# Patient Record
Sex: FEMALE | URBAN - METROPOLITAN AREA
[De-identification: names, ages, dates, MRNs, and addresses within clinical notes are randomized per-mention and may not be internally consistent; named-entity substitution may affect disease eponyms.]

---

## 2023-04-06 ENCOUNTER — ATHLETIC TRAINING (OUTPATIENT)
Dept: SPORTS MEDICINE | Facility: OTHER | Age: 23
End: 2023-04-06

## 2023-04-06 DIAGNOSIS — S09.93XA TOOTH INJURY, INITIAL ENCOUNTER: Primary | ICD-10-CM

## 2023-04-06 NOTE — PROGRESS NOTES
Subjective: Ingrid Flannery evaluated by  with complaints of tooth pain  Describes mechanism of injury as being tagged in the face with a softball while running to second base  Injury occurred during a home softball game on 3/30/23  She was alert and did not complain of any concussion S/S  She did appear to have a misplaced and chipped tooth as well as a laceration on her lip  Objective    Observation     Obvious tooth displacement and damage, no obvious deformities of the face or nose  No immediate bruising or swelling  Range of Motion: Range of motion was screened using visual estimates for purposes on field evaluation  If limitations were noted, formal goniometric measures were considered  Results denoted as being within normal limits (WNL), hypermobile (hyper), hypomobile (hypo), or not tested (NT)  Results for Ingrid Flannery are as followed:     Cervical Range of Motion:     AROM   Right  Left    Cervical Flexion WNL  WNL  Cervical Extension WNL  WNL  Cervical Rotation WNL  WNL  Cervical Sidebending WNL  WNL     Upper Extremity Range of Motion:     AROM   Right  Left    Shoulder Flexion WNL  WNL  Shoulder Abduction WNL  WNL  Shoulder ER  WNL  WNL  Shoulder IR  WNL  WNL  Elbow Flexion  WNL  WNL  Elbow Extension WNL  WNL  Wrist Flexion  WNL  WNL  Wrist Extension WNL  WNL     Strength Assessment    Manual Muscle Testing (MMT)    Upper Extremity MMT  Right Left  Shoulder Flexion  5/5 5/5  Shoulder Abduction  5/5 5/5  Shoulder External Rotation 5/5 5/5  Shoulder Internal Rotation  5/5 5/5  Shoulder Extension   5/5 5/5  Elbow Flexion    5/5 5/5  Elbow Extension  5/5 5/5     Palpation      No palpable tenderness around facial bones     Special Tests and Measures    Schaefer Sign: Schaefer sign is a contusion behind the ear that indicates a fracture at the bottom of the skull   Results for Ingrid Flannery are as followed: Negative    Racoon Eyes Sign: Racoon eyes sign is a contusion of the eyes that "indicates a basilar skull fracture  Results for Petros Moser are as followed: Negative    Cranial Nerve Testing           Cranial Nerve   Location     Type          Assessment      Results for Petros Moser   I - Olfactory  Cerebrum Sensory Patient is introduced to various smells while eyes are closed  Asked to identify smells to best of his/her ability   -   II - Optic  Cerebrum Sensory Assessed using peripheral vision screen  AT is behind patient and asks patient to state when finger first enters field of view  Compared bilaterally  -   III - Oculomotor Midbrain Motor Patient instructed to follow AT's finger traveling in \"H\" pattern  Also, observations of ptosis are reported  -   IV - Trochlear Midbrain Motor Patient instructed to follow AT's finger traveling in \"J\" pattern -   V - Trigeminal  Helen Mixed Light touch sensory assessment performed on face bilaterally as well as assessment of muscles of mastication performed  -   VI - Abducens  Helen Motor Saccadic eye movements assessed  -   VII - Facial Helen Mixed Assessment of ability to copy facial expressions performed  -    VIII - Vestibulocochlear  Helen Sensory AT rubs fingers together brining it closer to patient's ear  Patient reports when able to hear rubbing  Assessed bilaterally  -   IX - Glossopharyngeal Medulla  Mixed Tested with CN X   -   X - Vagus  Medulla  Mixed Assessed with observations of palate elevation, presence of dry mouth and/or hoarse voice  -   XI - Accessory  Medulla  Motor Trapezius and SCM MMT -   XIII - Hypoglossal  Medulla  Motor AT instructs patient for stick out tongue and say \"[place]\"  -      Sport Concussion Assessment Tool - 5th Edition (SCAT5): The SCAT5 is a standardized tool for evaluating concussions  Any athlete suspected of having a concussion will be removed from play, medically assessed and monitored for deterioration   The diagnosis of a concussion is a clinical judgement, made by a medical professional  The " "SCAT5 should not be used by itself to make, or exclude, the diagnosis of concussion  An athlete may have a concussion even if their SCAT5 is \"normal\"  Immediate/On-Field Assessment    Red Flags - Malou Frye is positive for the following red flags: None    Observable Signs: Results for [employer] are denoted as either yes (Y) if present or no (N) if not present  Lying Motionless on the Playing Surface: N  Balance/Gait Difficulties/Motor Incoordination: N  Disorientation or Confusion:  N  Blank or Vacant Look: N  Facial Injury after Head Trauma: Y (tooth displacement and lip laceration)    Memory Assessment George's Questions: Results for [employer] are denoted as either yes (Y) for correct answers or no (N) for incorrect answers   What Venue are we at today?: Y  Which half is it now?: Y  Who scored last in this match?: Y    Nohemi Coma Scale (GCS) Score: 15    Cervical Spine Assessment: Results for [employer] are denoted as either yes (Y) for applicable and no (N) if not applicable  Does the athlete report that their neck is pain free at rest?: Y  Does the athlete have a full range of active pain free movement?: Y  IS the limb strength and sensation normal?: Y        COMMENTS: [employer] experienced the following symptoms: Tooth pain      Assessment: Pt has a displaced/chipped tooth and lip laceration  She is not diagnosed with a concussion      Plan: Pt was immediately removed from play and taken to emergency dental services by her parents, she will continue to follow up with her dentist and be held out of participation until receiving a clearance note     "